# Patient Record
Sex: MALE | Race: OTHER | NOT HISPANIC OR LATINO
[De-identification: names, ages, dates, MRNs, and addresses within clinical notes are randomized per-mention and may not be internally consistent; named-entity substitution may affect disease eponyms.]

---

## 2019-06-26 PROBLEM — Z00.00 ENCOUNTER FOR PREVENTIVE HEALTH EXAMINATION: Status: ACTIVE | Noted: 2019-06-26

## 2019-11-07 ENCOUNTER — APPOINTMENT (OUTPATIENT)
Dept: ELECTROPHYSIOLOGY | Facility: CLINIC | Age: 59
End: 2019-11-07

## 2019-11-07 ENCOUNTER — APPOINTMENT (OUTPATIENT)
Dept: ELECTROPHYSIOLOGY | Facility: CLINIC | Age: 59
End: 2019-11-07
Payer: COMMERCIAL

## 2019-11-07 VITALS
BODY MASS INDEX: 41.3 KG/M2 | HEART RATE: 62 BPM | SYSTOLIC BLOOD PRESSURE: 110 MMHG | OXYGEN SATURATION: 97 % | HEIGHT: 66 IN | DIASTOLIC BLOOD PRESSURE: 62 MMHG | RESPIRATION RATE: 16 BRPM | WEIGHT: 257 LBS

## 2019-11-07 VITALS — DIASTOLIC BLOOD PRESSURE: 50 MMHG | SYSTOLIC BLOOD PRESSURE: 102 MMHG

## 2019-11-07 DIAGNOSIS — Z80.0 FAMILY HISTORY OF MALIGNANT NEOPLASM OF DIGESTIVE ORGANS: ICD-10-CM

## 2019-11-07 DIAGNOSIS — I48.91 UNSPECIFIED ATRIAL FIBRILLATION: ICD-10-CM

## 2019-11-07 DIAGNOSIS — I50.9 HEART FAILURE, UNSPECIFIED: ICD-10-CM

## 2019-11-07 DIAGNOSIS — Z92.21 PERSONAL HISTORY OF ANTINEOPLASTIC CHEMOTHERAPY: ICD-10-CM

## 2019-11-07 DIAGNOSIS — C85.90 NON-HODGKIN LYMPHOMA, UNSPECIFIED, UNSPECIFIED SITE: ICD-10-CM

## 2019-11-07 DIAGNOSIS — E78.5 HYPERLIPIDEMIA, UNSPECIFIED: ICD-10-CM

## 2019-11-07 DIAGNOSIS — Z80.6 FAMILY HISTORY OF LEUKEMIA: ICD-10-CM

## 2019-11-07 DIAGNOSIS — Z95.0 PRESENCE OF CARDIAC PACEMAKER: ICD-10-CM

## 2019-11-07 DIAGNOSIS — E03.9 HYPOTHYROIDISM, UNSPECIFIED: ICD-10-CM

## 2019-11-07 DIAGNOSIS — Z78.9 OTHER SPECIFIED HEALTH STATUS: ICD-10-CM

## 2019-11-07 DIAGNOSIS — F14.90 COCAINE USE, UNSPECIFIED, UNCOMPLICATED: ICD-10-CM

## 2019-11-07 DIAGNOSIS — Z87.898 PERSONAL HISTORY OF OTHER SPECIFIED CONDITIONS: ICD-10-CM

## 2019-11-07 DIAGNOSIS — C81.90 HODGKIN LYMPHOMA, UNSPECIFIED, UNSPECIFIED SITE: ICD-10-CM

## 2019-11-07 DIAGNOSIS — Z87.891 PERSONAL HISTORY OF NICOTINE DEPENDENCE: ICD-10-CM

## 2019-11-07 DIAGNOSIS — Z82.49 FAMILY HISTORY OF ISCHEMIC HEART DISEASE AND OTHER DISEASES OF THE CIRCULATORY SYSTEM: ICD-10-CM

## 2019-11-07 DIAGNOSIS — E11.9 TYPE 2 DIABETES MELLITUS W/OUT COMPLICATIONS: ICD-10-CM

## 2019-11-07 PROCEDURE — 93000 ELECTROCARDIOGRAM COMPLETE: CPT | Mod: 59

## 2019-11-07 PROCEDURE — 99213 OFFICE O/P EST LOW 20 MIN: CPT | Mod: 25

## 2019-11-07 PROCEDURE — 93261 INTERROGATE SUBQ DEFIB: CPT

## 2019-11-07 RX ORDER — MULTIVIT-MIN/FOLIC/VIT K/LYCOP 400-300MCG
50 MCG TABLET ORAL DAILY
Refills: 0 | Status: ACTIVE | COMMUNITY

## 2019-11-07 RX ORDER — FUROSEMIDE 40 MG/1
40 TABLET ORAL DAILY
Refills: 0 | Status: ACTIVE | COMMUNITY

## 2019-11-07 RX ORDER — SACUBITRIL AND VALSARTAN 97; 103 MG/1; MG/1
97-103 TABLET, FILM COATED ORAL TWICE DAILY
Qty: 180 | Refills: 3 | Status: ACTIVE | COMMUNITY

## 2019-11-07 RX ORDER — METOPROLOL SUCCINATE 100 MG/1
100 TABLET, EXTENDED RELEASE ORAL DAILY
Qty: 90 | Refills: 1 | Status: ACTIVE | COMMUNITY

## 2019-11-07 RX ORDER — LEVOTHYROXINE SODIUM 0.14 MG/1
137 TABLET ORAL DAILY
Refills: 0 | Status: ACTIVE | COMMUNITY

## 2019-11-07 RX ORDER — KRILL/OM-3/DHA/EPA/PHOSPHO/AST 1000-230MG
81 CAPSULE ORAL
Refills: 0 | Status: ACTIVE | COMMUNITY

## 2019-11-07 RX ORDER — RIVAROXABAN 20 MG/1
20 TABLET, FILM COATED ORAL
Qty: 90 | Refills: 3 | Status: ACTIVE | COMMUNITY

## 2019-11-07 NOTE — REVIEW OF SYSTEMS
[Negative] : Respiratory [Fever] : no fever [Chills] : no chills [Feeling Fatigued] : not feeling fatigued [Dyspnea on exertion] : not dyspnea during exertion [Shortness Of Breath] : no shortness of breath [Chest Pain] : no chest pain [Lower Ext Edema] : no extremity edema [Palpitations] : no palpitations [Dizziness] : no dizziness [Easy Bleeding] : no tendency for easy bleeding

## 2019-11-07 NOTE — ADDENDUM
[FreeTextEntry1] : I was present for the above evaluation and agree with the history, physical exam, assessment and plan as above. S-ICD as above. will continue to monitor.

## 2019-11-07 NOTE — HISTORY OF PRESENT ILLNESS
[de-identified] : 58 y/o M with pmhx DM,, hypothyroidism, HLD, Non-Hodgkins lymphoma s/p chemo therapy with subsequent NICM SICD implant (6/8/2015, Dr. Navarro), and prior asymptomatic PAF on Xarelto.  Follows with cardiologist, Dr. Manuel, at [facility].  \par \par SICD is under advisory for potential premature battery depletion. Has recently been checked.  Presents for routine device check

## 2019-11-07 NOTE — PHYSICAL EXAM
[General Appearance - Well Developed] : well developed [Dry] : dry [General Appearance - Well Nourished] : well nourished [Clean] : clean [Well-Healed] : well-healed [Heart Rate And Rhythm] : heart rate and rhythm were normal [Heart Sounds] : normal S1 and S2 [Respiration, Rhythm And Depth] : normal respiratory rhythm and effort [] : no respiratory distress [Erythema] : not erythematous [Tender] : not tender [Warm] : not warm [Indurated] : not indurated [FreeTextEntry1] : midaxillary

## 2019-11-07 NOTE — PROCEDURE
[No] : not [ICD] : Implantable cardioverter-defibrillator [NSR] : normal sinus rhythm [[employer]] : [facility] [Normal] : The battery status is normal. [de-identified] : 24706 [de-identified] : [id number] [de-identified] : 6/8/2015 [de-identified] : 39% [de-identified] : NO treated or untreated episodes. \par programmed in primary vector

## 2019-11-07 NOTE — DISCUSSION/SUMMARY
[FreeTextEntry1] : 60 y/o M with pmhx DM,, hypothyroidism, HLD, Non-Hodgkins lymphoma s/p chemo therapy with subsequent NICM SICD implant, and prior asymptomatic PAF on Xarelto.  Follows with cardiologist, Dr. Manuel, at [facility].  SICD is under advisory for potential premature battery depletion. Has recently been checked.  Presents for routine device check\par \par - SICD appears to be functioning normally.  Remaining battery life until JOE 39% which is appropriate for date of implant (6/2015)\par - Programmed in Primary vector.     NO treated or untreated episodes noted\par - Per protocol, magnet briefly placed over device to recreate audible tone that will sound if JOE triggered\par \par Recommend routine SICD check in 3 months to monitor battery longevity unless sooner is needed\par Seen and d/w Dr. Sandoval\par Alta Acevedo PAC\par \par

## 2020-03-04 ENCOUNTER — APPOINTMENT (OUTPATIENT)
Dept: ELECTROPHYSIOLOGY | Facility: CLINIC | Age: 60
End: 2020-03-04
Payer: MEDICAID

## 2020-03-04 VITALS
HEART RATE: 66 BPM | HEIGHT: 66 IN | DIASTOLIC BLOOD PRESSURE: 65 MMHG | BODY MASS INDEX: 42.11 KG/M2 | SYSTOLIC BLOOD PRESSURE: 96 MMHG | OXYGEN SATURATION: 95 % | WEIGHT: 262 LBS

## 2020-03-04 PROCEDURE — 93261 INTERROGATE SUBQ DEFIB: CPT

## 2020-03-04 NOTE — HISTORY OF PRESENT ILLNESS
[de-identified] : 58 y/o M with pmhx DM,, hypothyroidism, HLD, Non-Hodgkins lymphoma s/p chemo therapy with subsequent NICM SICD implant (6/8/2015, Dr. Navarro), and prior asymptomatic PAF on Xarelto.  Follows with cardiologist, Dr. Manuel, at [facility].  \par \par SICD is under advisory for potential premature battery depletion. Has recently been checked.  Presents for routine device check

## 2020-03-04 NOTE — PHYSICAL EXAM
[General Appearance - Well Developed] : well developed [General Appearance - Well Nourished] : well nourished [Dry] : dry [Clean] : clean [Well-Healed] : well-healed [Warm] : not warm [Erythema] : not erythematous [Indurated] : not indurated [Tender] : not tender [FreeTextEntry1] : midaxillary

## 2020-03-04 NOTE — DISCUSSION/SUMMARY
[FreeTextEntry1] : 60 y/o M with pmhx DM,, hypothyroidism, HLD, Non-Hodgkins lymphoma s/p chemo therapy with subsequent NICM SICD implant, and prior asymptomatic PAF on Xarelto.  Follows with cardiologist, Dr. Manuel, at [facility].  SICD is under advisory for potential premature battery depletion. Has recently been checked.  Presents for routine device check\par \par - SICD appears to be functioning normally.  Remaining battery life until JOE 30% which is appropriate for date of implant (6/2015)\par - Programmed in Primary vector.     NO treated or untreated episodes noted\par \par Recommend routine SICD check in 3 months to monitor battery longevity unless sooner is needed\par \par Love Castillo ANP-C \par

## 2020-03-04 NOTE — REVIEW OF SYSTEMS
[Chills] : no chills [Fever] : no fever [Feeling Fatigued] : not feeling fatigued [Shortness Of Breath] : no shortness of breath [Dyspnea on exertion] : not dyspnea during exertion [Chest Pain] : no chest pain [Dizziness] : no dizziness [Lower Ext Edema] : no extremity edema [Palpitations] : no palpitations [Easy Bleeding] : no tendency for easy bleeding [Negative] : Respiratory

## 2020-06-25 ENCOUNTER — APPOINTMENT (OUTPATIENT)
Dept: ELECTROPHYSIOLOGY | Facility: CLINIC | Age: 60
End: 2020-06-25

## 2020-10-14 NOTE — HISTORY OF PRESENT ILLNESS
[de-identified] : 59 y/o M with pmhx DM, hypothyroidism, HLD, Non-Hodgkins lymphoma s/p chemo therapy with subsequent NICM SICD implant (6/8/2015, Dr. Navarro), and prior asymptomatic PAF on Xarelto. Follows with cardiologist, Dr. Manuel, at [facility]. \par \par SICD is under advisory for potential premature battery depletion.  Presents for routine device check. \par INCOMPLETE NOTE NOT YET SEEN

## 2020-10-15 ENCOUNTER — APPOINTMENT (OUTPATIENT)
Dept: ELECTROPHYSIOLOGY | Facility: CLINIC | Age: 60
End: 2020-10-15

## 2021-05-13 ENCOUNTER — NON-APPOINTMENT (OUTPATIENT)
Age: 61
End: 2021-05-13

## 2021-05-13 ENCOUNTER — APPOINTMENT (OUTPATIENT)
Dept: ELECTROPHYSIOLOGY | Facility: CLINIC | Age: 61
End: 2021-05-13
Payer: MEDICAID

## 2021-05-13 VITALS
TEMPERATURE: 98 F | HEART RATE: 62 BPM | SYSTOLIC BLOOD PRESSURE: 98 MMHG | BODY MASS INDEX: 41.3 KG/M2 | HEIGHT: 66 IN | DIASTOLIC BLOOD PRESSURE: 66 MMHG | WEIGHT: 257 LBS | OXYGEN SATURATION: 95 %

## 2021-05-13 DIAGNOSIS — Z45.02 ENCOUNTER FOR ADJUSTMENT AND MANAGEMENT OF AUTOMATIC IMPLANTABLE CARDIAC DEFIBRILLATOR: ICD-10-CM

## 2021-05-13 PROCEDURE — 99214 OFFICE O/P EST MOD 30 MIN: CPT

## 2021-05-13 PROCEDURE — 93282 PRGRMG EVAL IMPLANTABLE DFB: CPT

## 2021-05-13 PROCEDURE — 93000 ELECTROCARDIOGRAM COMPLETE: CPT | Mod: 59

## 2021-05-13 RX ORDER — THIAMINE MONONITRATE (VIT B1) 100 MG
100 TABLET ORAL DAILY
Refills: 0 | Status: DISCONTINUED | COMMUNITY
End: 2021-05-13

## 2021-05-13 RX ORDER — SITAGLIPTIN 50 MG/1
50 TABLET, FILM COATED ORAL DAILY
Refills: 0 | Status: ACTIVE | COMMUNITY

## 2021-05-13 NOTE — PHYSICAL EXAM
[Normal] : well developed, well nourished, no acute distress [Well Developed] : well developed [Well Nourished] : well nourished [No Acute Distress] : no acute distress [Obese] : obese [Normal Conjunctiva] : normal conjunctiva [Normal Venous Pressure] : normal venous pressure [Normal S1, S2] : normal S1, S2 [Clear Lung Fields] : clear lung fields [Good Air Entry] : good air entry [No Respiratory Distress] : no respiratory distress  [Soft] : abdomen soft [Normal Gait] : normal gait [No Rash] : no rash [Moves all extremities] : moves all extremities [Alert and Oriented] : alert and oriented [Normal memory] : normal memory [de-identified] : distant heart sounds, irregularly irregular [de-identified] : 1+ edema

## 2021-05-13 NOTE — HISTORY OF PRESENT ILLNESS
[FreeTextEntry1] : 61 year old gentleman with history of DM, HLD, hypothyroidism, non-Hodgkins lymphoma s/p chemo, NICMP s/p S-ICD implant, and atrial fibrillation presenting for ICD management.  \par \par His S-ICD was implanted in 6/2015 for primary prevention, and has been functioning normally, but is now under advisory for risk of premature battery depletion and rare risk of device failure (the ICD electrode model 3010 is not on advisory). Last year battery longevity was noted to be 30%, and on follow-up today, the estimated battery longevity is 5% to JOE. \par \par No ventricular arrhythmias have been recorded, and the device is functioning normally.  \par \par He does also have a history of pAF, which had been minimally symptomatic in the past, and has been on anticoagulation with Xarelto and rate control with Toprol. On evaluation today, he is in persistent AF with controlled rates. He does have stable exertional dyspnea and mild palpitation, but denies progressive exertional limitations or syncope. He does follow-up closely with his cardiologist and CHF specialist at [facility].  \par \par Of note, he will be traveling for several months out of state to take care of his mother in law, and is therefore requesting ICD exchange before he leaves.

## 2021-05-13 NOTE — DISCUSSION/SUMMARY
[FreeTextEntry1] :  61 year old gentleman with history of DM, HLD, hypothyroidism, non-Hodgkins lymphoma s/p chemo, NICMP s/p S-ICD implant, and atrial fibrillation presenting for ICD management. His subcutaneous ICD is on advisory, and has now had accelerated battery depletion, with estimated 5% to JOE (though this may be inaccurate/overestimated in the setting of the risk for premature battery failure). He does have CHF and remains at increased risk for ventricular arrhythmia, and warrants device generator replacement. There is no current indication for pacing therapy. We did discuss the potential for transition to a traditional transvenous ICD given the ongoing advisory on the S-ICD generator, but he is not interested in this. Will plan elective S-ICD gen change before his planned travel. The risks and benefits of generator replacement were reviewed, including procedure related risks such as bleeding, infection and pain.  \par \par In addition, he has atrial fibrillation which appears to have progressed to persistent AF. He denies acute symptoms, but has had progressive dyspnea related to CHF, which I suspect is exacerbated by his AF. WE did discuss options for rhythm control, including DCCV and potentially catheter ablation. He will discuss this further with his primary cardiology team, and will arrange follow-up in the future for this. For now he will continue rate control and anticoagulation.  \par \par -S-ICD generator replacement. Marino Hansonreljosé miguel x 2 days prior.  \par \par -EP followup after above. \par \par

## 2021-05-13 NOTE — REVIEW OF SYSTEMS
[Fever] : no fever [Chills] : no chills [Feeling Fatigued] : feeling fatigued [SOB] : no shortness of breath [Dyspnea on exertion] : dyspnea during exertion [Leg Claudication] : intermittent leg claudication [Palpitations] : no palpitations [Syncope] : no syncope [Dizziness] : no dizziness [Convulsions] : no convulsions [Negative] : Heme/Lymph

## 2021-06-21 ENCOUNTER — TRANSCRIPTION ENCOUNTER (OUTPATIENT)
Age: 61
End: 2021-06-21

## 2021-06-21 ENCOUNTER — OUTPATIENT (OUTPATIENT)
Dept: OUTPATIENT SERVICES | Facility: HOSPITAL | Age: 61
LOS: 1 days | Discharge: HOME | End: 2021-06-21
Payer: COMMERCIAL

## 2021-06-21 VITALS
OXYGEN SATURATION: 97 % | SYSTOLIC BLOOD PRESSURE: 103 MMHG | DIASTOLIC BLOOD PRESSURE: 57 MMHG | HEART RATE: 71 BPM | TEMPERATURE: 98 F | RESPIRATION RATE: 16 BRPM

## 2021-06-21 VITALS
OXYGEN SATURATION: 100 % | RESPIRATION RATE: 18 BRPM | SYSTOLIC BLOOD PRESSURE: 104 MMHG | HEART RATE: 72 BPM | DIASTOLIC BLOOD PRESSURE: 54 MMHG

## 2021-06-21 DIAGNOSIS — Z95.810 PRESENCE OF AUTOMATIC (IMPLANTABLE) CARDIAC DEFIBRILLATOR: ICD-10-CM

## 2021-06-21 LAB
ABO RH CONFIRMATION: SIGNIFICANT CHANGE UP
ANION GAP SERPL CALC-SCNC: 11 MMOL/L — SIGNIFICANT CHANGE UP (ref 5–17)
BLD GP AB SCN SERPL QL: SIGNIFICANT CHANGE UP
BUN SERPL-MCNC: 45.5 MG/DL — HIGH (ref 8–20)
CALCIUM SERPL-MCNC: 9.3 MG/DL — SIGNIFICANT CHANGE UP (ref 8.6–10.2)
CHLORIDE SERPL-SCNC: 103 MMOL/L — SIGNIFICANT CHANGE UP (ref 98–107)
CO2 SERPL-SCNC: 26 MMOL/L — SIGNIFICANT CHANGE UP (ref 22–29)
CREAT SERPL-MCNC: 1.52 MG/DL — HIGH (ref 0.5–1.3)
GLUCOSE SERPL-MCNC: 179 MG/DL — HIGH (ref 70–99)
HCT VFR BLD CALC: 36.9 % — LOW (ref 39–50)
HGB BLD-MCNC: 11.8 G/DL — LOW (ref 13–17)
MAGNESIUM SERPL-MCNC: 2.1 MG/DL — SIGNIFICANT CHANGE UP (ref 1.6–2.6)
MCHC RBC-ENTMCNC: 28.3 PG — SIGNIFICANT CHANGE UP (ref 27–34)
MCHC RBC-ENTMCNC: 32 GM/DL — SIGNIFICANT CHANGE UP (ref 32–36)
MCV RBC AUTO: 88.5 FL — SIGNIFICANT CHANGE UP (ref 80–100)
PLATELET # BLD AUTO: 112 K/UL — LOW (ref 150–400)
POTASSIUM SERPL-MCNC: 4.1 MMOL/L — SIGNIFICANT CHANGE UP (ref 3.5–5.3)
POTASSIUM SERPL-SCNC: 4.1 MMOL/L — SIGNIFICANT CHANGE UP (ref 3.5–5.3)
RBC # BLD: 4.17 M/UL — LOW (ref 4.2–5.8)
RBC # FLD: 16.2 % — HIGH (ref 10.3–14.5)
SODIUM SERPL-SCNC: 140 MMOL/L — SIGNIFICANT CHANGE UP (ref 135–145)
WBC # BLD: 3.13 K/UL — LOW (ref 3.8–10.5)
WBC # FLD AUTO: 3.13 K/UL — LOW (ref 3.8–10.5)

## 2021-06-21 PROCEDURE — 83735 ASSAY OF MAGNESIUM: CPT

## 2021-06-21 PROCEDURE — 86901 BLOOD TYPING SEROLOGIC RH(D): CPT

## 2021-06-21 PROCEDURE — C1722: CPT

## 2021-06-21 PROCEDURE — 33270 INS/REP SUBQ DEFIBRILLATOR: CPT

## 2021-06-21 PROCEDURE — 93010 ELECTROCARDIOGRAM REPORT: CPT

## 2021-06-21 PROCEDURE — 86900 BLOOD TYPING SEROLOGIC ABO: CPT

## 2021-06-21 PROCEDURE — 86850 RBC ANTIBODY SCREEN: CPT

## 2021-06-21 PROCEDURE — 93005 ELECTROCARDIOGRAM TRACING: CPT

## 2021-06-21 PROCEDURE — 80048 BASIC METABOLIC PNL TOTAL CA: CPT

## 2021-06-21 PROCEDURE — 85027 COMPLETE CBC AUTOMATED: CPT

## 2021-06-21 PROCEDURE — 36415 COLL VENOUS BLD VENIPUNCTURE: CPT

## 2021-06-21 RX ORDER — SACUBITRIL AND VALSARTAN 24; 26 MG/1; MG/1
1 TABLET, FILM COATED ORAL
Qty: 0 | Refills: 0 | DISCHARGE

## 2021-06-21 RX ORDER — SITAGLIPTIN 50 MG/1
1 TABLET, FILM COATED ORAL
Qty: 0 | Refills: 0 | DISCHARGE

## 2021-06-21 RX ORDER — ASPIRIN/CALCIUM CARB/MAGNESIUM 324 MG
1 TABLET ORAL
Qty: 0 | Refills: 0 | DISCHARGE

## 2021-06-21 RX ORDER — ATORVASTATIN CALCIUM 80 MG/1
1 TABLET, FILM COATED ORAL
Qty: 0 | Refills: 0 | DISCHARGE

## 2021-06-21 RX ORDER — FUROSEMIDE 40 MG
1 TABLET ORAL
Qty: 0 | Refills: 0 | DISCHARGE

## 2021-06-21 RX ORDER — ACETAMINOPHEN 500 MG
0 TABLET ORAL
Qty: 0 | Refills: 0 | DISCHARGE

## 2021-06-21 RX ORDER — METOPROLOL TARTRATE 50 MG
1 TABLET ORAL
Qty: 0 | Refills: 0 | DISCHARGE

## 2021-06-21 RX ORDER — LEVOTHYROXINE SODIUM 125 MCG
1 TABLET ORAL
Qty: 0 | Refills: 0 | DISCHARGE

## 2021-06-21 RX ORDER — RIVAROXABAN 15 MG-20MG
1 KIT ORAL
Qty: 0 | Refills: 0 | DISCHARGE

## 2021-06-21 NOTE — PROGRESS NOTE ADULT - SUBJECTIVE AND OBJECTIVE BOX
ELECTROPHYSIOLOGY BRIEF POST-OP NOTE    I have personally seen and examined the patient today in cath lab recovery area.     PRE-OP DIAGNOSIS: ICD at JOE    POST-OP DIAGNOSIS: same    PROCEDURE: [employer] SICD generator replacement     COMPLICATIONS: None    CARDIOMYOPATHY: YES - NICM    IMPLANT EF%: 50% - formerly 30%    NYHA CLASS: II     BETA BLOCKER: Toprol XL    ACE/ARB: Entresto    Physician: Shaka Sandoval MD  Assistant: None    ESTIMATED BLOOD LOSS: <15mL    ANESTHESIA TYPE:  [   ]General Anesthesia  [ x ]Sedation  [   ]Local/Regional    CONDITION:  [  ]Critical  [  ]Serious  [ x ]Stable  [  ]Good    SPECIMENS REMOVED (if applicable): old BSI SICD generator    IMPLANT (if applicable): [employer] SICD generator    HR: 71   BP: 103/57   RR: 16   SpO2: 97%     Physical Exam:  Constitutional: NAD, AAOx3  Cardiovascular: +S1S2 RRR  Pulmonary: CTA b/l, unlabored  Left Flank:   GI: soft NTND +BS  Extremities: no pedal edema,   Neuro: non focal, MCKINNEY x4    A/P  61 year old gentleman with history of DM, HLD, hypothyroidism, non-Hodgkins lymphoma s/p chemo, NICMP, EF 25%, s/p S-ICD implant, and persistent atrial fibrillation on Xarelto who is s/p successful SICD generator replacement.     - Keflex 500mg BID x 24 hours  - Resume Xarelto in 48 hours  - Discharge home today

## 2021-06-21 NOTE — DISCHARGE NOTE PROVIDER - NSDCCPCAREPLAN_GEN_ALL_CORE_FT
PRINCIPAL DISCHARGE DIAGNOSIS  Diagnosis: Implantable cardioverter-defibrillator (ICD) at end of device life  Assessment and Plan of Treatment:

## 2021-06-21 NOTE — DISCHARGE NOTE PROVIDER - NSDCMRMEDTOKEN_GEN_ALL_CORE_FT
aspirin 81 mg oral tablet, chewable: 1 tab(s) orally once a day  atorvastatin 40 mg oral tablet: 1 tab(s) orally once a day  Entresto 97 mg-103 mg oral tablet: 1 tab(s) orally 2 times a day  furosemide 40 mg oral tablet: 1 tab(s) orally once a day  Januvia 50 mg oral tablet: 1 tab(s) orally once a day  levothyroxine 137 mcg (0.137 mg) oral tablet: 1 tab(s) orally once a day  metoprolol succinate 100 mg oral capsule, extended release: 1 cap(s) orally once a day  Tylenol 325 mg oral capsule:   Xarelto 20 mg oral tablet: 1 tab(s) orally once a day (in the evening)   aspirin 81 mg oral tablet, chewable: 1 tab(s) orally once a day  atorvastatin 40 mg oral tablet: 1 tab(s) orally once a day  Entresto 97 mg-103 mg oral tablet: 1 tab(s) orally 2 times a day  furosemide 40 mg oral tablet: 1 tab(s) orally once a day  Januvia 50 mg oral tablet: 1 tab(s) orally once a day  Keflex 500 mg oral capsule: 1 cap(s) orally 2 times a day x 1 days   levothyroxine 137 mcg (0.137 mg) oral tablet: 1 tab(s) orally once a day  metoprolol succinate 100 mg oral capsule, extended release: 1 cap(s) orally once a day  Tylenol 325 mg oral capsule:   Xarelto 20 mg oral tablet: 1 tab(s) orally once a day (in the evening)

## 2021-06-21 NOTE — DISCHARGE NOTE PROVIDER - NSDCCPTREATMENT_GEN_ALL_CORE_FT
PRINCIPAL PROCEDURE  Procedure: Replacement, ICD, without DFT testing  Findings and Treatment: Cardiac Device Implant Post Operative Instructions  - Do not touch the incision until it is completely healed.   - There are Steristrips (white strips of tape) on your incision, which will start to peel off on their own over the next 2-3 weeks. Do not pick at or peel off the Steristrips.   - Bruising around the implant site or over the chest, side or arm near the incision is normal, and will take a few weeks to resolve.   - Do not apply soaps, creams, lotions, ointments or powders to the incision until it is completely healed.  - You may take a shower in 24 hours, and allow the water to run over the incision. However, do not submerge the incision in water: do not swim or soak in bath tubs, hot tubs, swimming pools, etc.   You should call the doctor if:   - You notice redness, drainage, swelling, increased tenderness, hot sensation around the incision, bleeding or incision edges pulling apart.  - Your temperature is greater than 100 degrees F for more than 24 hours.  - You notice swelling or bulging at the incision or around the device that was not there when you left the hospital or is increasing in size.  - You experience increased difficulty breathing.  - You notice new/worsening swelling in your legs and ankles.  - You faint or have dizzy spells.  - You have any questions or concerns regarding your device or the procedure.

## 2021-06-21 NOTE — DISCHARGE NOTE PROVIDER - NSDCFUADDINST_GEN_ALL_CORE_FT
Follow up with Dr. Sandoval in two weeks time. The office will call you with an appointment in 3-5 days time.   You may resume your Xalrelto on. . .  Follow up with Dr. Sandoval in two weeks time. The office will call you with an appointment in 3-5 days time.   You may resume your Xarelto in 48 hours on 6/24/21 Follow up with Dr. Sandoval in two weeks time. The office will call you with an appointment in 3-5 days time.   You may resume your Xarelto in 48 hours on 6/24/21  You may take extra strength Tylenol for pain.

## 2021-06-21 NOTE — DISCHARGE NOTE PROVIDER - HOSPITAL COURSE
61 year old gentleman with history of DM, HLD, hypothyroidism, non-Hodgkins lymphoma s/p chemo, NICMP, EF 25%, s/p S-ICD implant, and persistent atrial fibrillation on Xarelto who is now s/p successful SICD generator replacement.

## 2021-06-21 NOTE — DISCHARGE NOTE PROVIDER - CARE PROVIDER_API CALL
Shaka Sandoval)  Cardiology; Internal Medicine  [address] [facility], [address] [place] [address]  Phone: [phone]  Fax: [phone]  Follow Up Time: 2 weeks

## 2021-06-21 NOTE — DISCHARGE NOTE NURSING/CASE MANAGEMENT/SOCIAL WORK - PATIENT PORTAL LINK FT
You can access the FollowMyHealth Patient Portal offered by [facility] by registering at the following website: [email or web address]. By joining [employer]s FollowMyHealth portal, you will also be able to view your health information using other applications (apps) compatible with our system.

## 2021-06-21 NOTE — H&P PST ADULT - ASSESSMENT
61 year old gentleman with history of DM, HLD, hypothyroidism, non-Hodgkins lymphoma s/p chemo, NICMP, EF 25%, s/p S-ICD implant, and persistent atrial fibrillation on Xarelto who is presenting with device at [facility]. Plan for generator replacement today    - Arrived in fasting state  - Last dose of Xarelto on PM of 6/18/21  - COVID vaccinated.

## 2021-06-21 NOTE — H&P PST ADULT - HISTORY OF PRESENT ILLNESS
61 year old gentleman with history of DM, HLD, hypothyroidism, non-Hodgkins lymphoma s/p chemo, NICMP, EF 25%, s/p S-ICD implant, and persistent atrial fibrillation on Xarelto who is presenting with device at [facility]. His S-ICD was implanted in 6/2015 for primary prevention, and has been functioning normally, but is now under advisory for risk of premature battery depletion and rare risk of device failure (the ICD electrode model 3010 is not on advisory). Last year battery longevity was noted to be 30%, and on follow-up today, the estimated battery longevity is 5% to [facility]. No ventricular arrhythmias have been recorded, and the device is functioning normally. He does also have a history of pAF, which had been minimally symptomatic in the past, and has been on anticoagulation with Xarelto and rate control with Toprol. On evaluation today, he is in persistent AF with controlled rates. He does have stable exertional dyspnea and mild palpitation, but denies progressive exertional limitations or syncope.     Cardiology Summary   Echo: 3/12/2020: EF 50%, moderate LAE, mild mitral annular calcification.   Echo: 12/7/19: EF 40-45%

## 2021-06-22 RX ORDER — CEPHALEXIN 500 MG
1 CAPSULE ORAL
Qty: 2 | Refills: 0
Start: 2021-06-22 | End: 2021-06-22

## 2021-07-02 DIAGNOSIS — Z45.02 ENCOUNTER FOR ADJUSTMENT AND MANAGEMENT OF AUTOMATIC IMPLANTABLE CARDIAC DEFIBRILLATOR: ICD-10-CM

## 2021-07-06 ENCOUNTER — APPOINTMENT (OUTPATIENT)
Dept: ELECTROPHYSIOLOGY | Facility: CLINIC | Age: 61
End: 2021-07-06
Payer: MEDICAID

## 2021-07-06 VITALS
SYSTOLIC BLOOD PRESSURE: 92 MMHG | BODY MASS INDEX: 40.98 KG/M2 | TEMPERATURE: 98.6 F | HEART RATE: 80 BPM | OXYGEN SATURATION: 95 % | DIASTOLIC BLOOD PRESSURE: 61 MMHG | WEIGHT: 255 LBS | HEIGHT: 66 IN

## 2021-07-06 DIAGNOSIS — I42.8 OTHER CARDIOMYOPATHIES: ICD-10-CM

## 2021-07-06 DIAGNOSIS — Z95.810 PRESENCE OF AUTOMATIC (IMPLANTABLE) CARDIAC DEFIBRILLATOR: ICD-10-CM

## 2021-07-06 PROCEDURE — 93261 INTERROGATE SUBQ DEFIB: CPT

## 2021-07-06 PROCEDURE — 99024 POSTOP FOLLOW-UP VISIT: CPT

## 2021-07-06 NOTE — HISTORY OF PRESENT ILLNESS
[FreeTextEntry1] : 61 year old gentleman with history of DM, HLD, hypothyroidism, non-Hodgkins lymphoma s/p chemo, NICMP, EF 25%, s/p S-ICD implant, and persistent atrial fibrillation on Xarelto who is s/p successful SICD generator replacement ON 6/21/21.\par \par Since implant he reports he has been feeling well. Denies pain from implant site. Left midaxillary incision well approximated without erythema, edema, or exudate. Pocket without hematoma. Interrogation of device reveals normal function. 100% battery status to JOE. Electrode impedance status OK. No events since implant.

## 2021-07-06 NOTE — ASSESSMENT
[FreeTextEntry1] : [employer] : A 210 SN: [id number] \par \par Battery status to JOE: 100%\par Electrode impedance: OK\par No events since implant

## 2021-07-06 NOTE — DISCUSSION/SUMMARY
[FreeTextEntry1] : 61 year old gentleman with history of DM, HLD, hypothyroidism, non-Hodgkins lymphoma s/p chemo, NICMP, EF 25%, s/p S-ICD implant, and persistent atrial fibrillation on Xarelto who is s/p successful SICD generator replacement ON 6/21/21.\par \par Since implant he reports he has been feeling well. Denies pain from implant site. Left midaxillary incision well approximated without erythema, edema, or exudate. Pocket without hematoma. Interrogation of device reveals normal function. 100% battery status to JOE. Electrode impedance status OK. No events since implant. \par \par Recommendation: \par \par - Site care, post op restrictions, and remote follow up reviewed. Currently enrolled in our remote clinic, to continue with in person device follow up with Dr. Manuel per patient request.\par -In regards to AF management, he reports he discussed management with primary cardiology team and does not wish to pursue rhythm control strategies. EP follow up PRN.\par \par Love Castillo ANP-C

## 2021-09-23 ENCOUNTER — NON-APPOINTMENT (OUTPATIENT)
Age: 61
End: 2021-09-23

## 2021-09-23 ENCOUNTER — APPOINTMENT (OUTPATIENT)
Dept: ELECTROPHYSIOLOGY | Facility: CLINIC | Age: 61
End: 2021-09-23
Payer: MEDICAID

## 2021-09-23 PROCEDURE — 93294 REM INTERROG EVL PM/LDLS PM: CPT

## 2021-09-23 PROCEDURE — 93296 REM INTERROG EVL PM/IDS: CPT

## 2021-12-23 ENCOUNTER — APPOINTMENT (OUTPATIENT)
Dept: ELECTROPHYSIOLOGY | Facility: CLINIC | Age: 61
End: 2021-12-23
Payer: MEDICAID

## 2021-12-23 ENCOUNTER — NON-APPOINTMENT (OUTPATIENT)
Age: 61
End: 2021-12-23

## 2021-12-23 PROCEDURE — 93295 DEV INTERROG REMOTE 1/2/MLT: CPT | Mod: NC

## 2021-12-23 PROCEDURE — 93296 REM INTERROG EVL PM/IDS: CPT | Mod: NC

## 2022-03-24 ENCOUNTER — NON-APPOINTMENT (OUTPATIENT)
Age: 62
End: 2022-03-24

## 2022-03-24 ENCOUNTER — APPOINTMENT (OUTPATIENT)
Dept: ELECTROPHYSIOLOGY | Facility: CLINIC | Age: 62
End: 2022-03-24
Payer: MEDICAID

## 2022-03-24 PROCEDURE — 93296 REM INTERROG EVL PM/IDS: CPT

## 2022-03-24 PROCEDURE — 93295 DEV INTERROG REMOTE 1/2/MLT: CPT

## 2022-06-30 ENCOUNTER — NON-APPOINTMENT (OUTPATIENT)
Age: 62
End: 2022-06-30

## 2022-06-30 ENCOUNTER — APPOINTMENT (OUTPATIENT)
Dept: ELECTROPHYSIOLOGY | Facility: CLINIC | Age: 62
End: 2022-06-30

## 2022-06-30 PROCEDURE — 93295 DEV INTERROG REMOTE 1/2/MLT: CPT

## 2022-06-30 PROCEDURE — 93296 REM INTERROG EVL PM/IDS: CPT

## 2022-09-01 NOTE — DISCHARGE NOTE NURSING/CASE MANAGEMENT/SOCIAL WORK - NSDCPEXARELTO_GEN_ALL_CORE
Rivaroxaban/Xarelto - Compliance/Rivaroxaban/Xarelto - Dietary Advice/Rivaroxaban/Xarelto - Follow up monitoring/Rivaroxaban/Xarelto - Potential for adverse drug reactions and interactions Purse String (Intermediate) Text: Given the location of the defect and the characteristics of the surrounding skin a purse string intermediate closure was deemed most appropriate.  Undermining was performed circumferentially around the surgical defect.  A purse string suture was then placed and tightened.

## 2022-09-29 ENCOUNTER — NON-APPOINTMENT (OUTPATIENT)
Age: 62
End: 2022-09-29

## 2022-09-29 ENCOUNTER — APPOINTMENT (OUTPATIENT)
Dept: ELECTROPHYSIOLOGY | Facility: CLINIC | Age: 62
End: 2022-09-29

## 2022-09-29 PROCEDURE — 93296 REM INTERROG EVL PM/IDS: CPT

## 2022-09-29 PROCEDURE — 93295 DEV INTERROG REMOTE 1/2/MLT: CPT

## 2022-12-30 ENCOUNTER — NON-APPOINTMENT (OUTPATIENT)
Age: 62
End: 2022-12-30

## 2022-12-30 ENCOUNTER — APPOINTMENT (OUTPATIENT)
Dept: ELECTROPHYSIOLOGY | Facility: CLINIC | Age: 62
End: 2022-12-30
Payer: MEDICAID

## 2022-12-30 PROCEDURE — 93296 REM INTERROG EVL PM/IDS: CPT

## 2022-12-30 PROCEDURE — 93295 DEV INTERROG REMOTE 1/2/MLT: CPT

## 2023-03-31 ENCOUNTER — APPOINTMENT (OUTPATIENT)
Dept: ELECTROPHYSIOLOGY | Facility: CLINIC | Age: 63
End: 2023-03-31
Payer: MEDICAID

## 2023-03-31 ENCOUNTER — NON-APPOINTMENT (OUTPATIENT)
Age: 63
End: 2023-03-31

## 2023-03-31 PROCEDURE — 93295 DEV INTERROG REMOTE 1/2/MLT: CPT

## 2023-03-31 PROCEDURE — 93296 REM INTERROG EVL PM/IDS: CPT

## 2023-06-30 ENCOUNTER — APPOINTMENT (OUTPATIENT)
Dept: ELECTROPHYSIOLOGY | Facility: CLINIC | Age: 63
End: 2023-06-30

## 2023-07-02 ENCOUNTER — FORM ENCOUNTER (OUTPATIENT)
Age: 63
End: 2023-07-02

## 2023-08-04 ENCOUNTER — APPOINTMENT (OUTPATIENT)
Dept: ELECTROPHYSIOLOGY | Facility: CLINIC | Age: 63
End: 2023-08-04